# Patient Record
Sex: MALE
[De-identification: names, ages, dates, MRNs, and addresses within clinical notes are randomized per-mention and may not be internally consistent; named-entity substitution may affect disease eponyms.]

---

## 2022-12-19 ENCOUNTER — OFFICE VISIT (OUTPATIENT)
Dept: FAMILY MEDICINE CLINIC | Facility: CLINIC | Age: 47
End: 2022-12-19
Payer: OTHER GOVERNMENT

## 2022-12-19 VITALS
HEIGHT: 68 IN | OXYGEN SATURATION: 98 % | SYSTOLIC BLOOD PRESSURE: 112 MMHG | BODY MASS INDEX: 30.68 KG/M2 | WEIGHT: 202.4 LBS | HEART RATE: 63 BPM | DIASTOLIC BLOOD PRESSURE: 82 MMHG

## 2022-12-19 DIAGNOSIS — Z00.00 HEALTHCARE MAINTENANCE: Primary | ICD-10-CM

## 2022-12-19 DIAGNOSIS — L72.0 INCLUSION CYST: ICD-10-CM

## 2022-12-19 PROBLEM — M54.50 LOW BACK PAIN: Status: ACTIVE | Noted: 2022-12-19

## 2022-12-19 LAB
ALBUMIN SERPL-MCNC: 4.2 G/DL (ref 3.5–5)
ALBUMIN/GLOB SERPL: 1.4 {RATIO} (ref 0.4–1.6)
ALP SERPL-CCNC: 59 U/L (ref 50–136)
ALT SERPL-CCNC: 33 U/L (ref 12–65)
ANION GAP SERPL CALC-SCNC: 1 MMOL/L (ref 2–11)
AST SERPL-CCNC: 25 U/L (ref 15–37)
BASOPHILS # BLD: 0.1 K/UL (ref 0–0.2)
BASOPHILS NFR BLD: 2 % (ref 0–2)
BILIRUB DIRECT SERPL-MCNC: <0.1 MG/DL
BILIRUB SERPL-MCNC: 0.6 MG/DL (ref 0.2–1.1)
BUN SERPL-MCNC: 18 MG/DL (ref 6–23)
CALCIUM SERPL-MCNC: 9.3 MG/DL (ref 8.3–10.4)
CHLORIDE SERPL-SCNC: 105 MMOL/L (ref 101–110)
CHOLEST SERPL-MCNC: 192 MG/DL
CO2 SERPL-SCNC: 34 MMOL/L (ref 21–32)
CREAT SERPL-MCNC: 1 MG/DL (ref 0.8–1.5)
DIFFERENTIAL METHOD BLD: NORMAL
EOSINOPHIL # BLD: 0.1 K/UL (ref 0–0.8)
EOSINOPHIL NFR BLD: 1 % (ref 0.5–7.8)
ERYTHROCYTE [DISTWIDTH] IN BLOOD BY AUTOMATED COUNT: 13.4 % (ref 11.9–14.6)
GLOBULIN SER CALC-MCNC: 2.9 G/DL (ref 2.8–4.5)
GLUCOSE SERPL-MCNC: 78 MG/DL (ref 65–100)
HCT VFR BLD AUTO: 49.4 % (ref 41.1–50.3)
HDLC SERPL-MCNC: 50 MG/DL (ref 40–60)
HDLC SERPL: 3.8 {RATIO}
HGB BLD-MCNC: 16.3 G/DL (ref 13.6–17.2)
IMM GRANULOCYTES # BLD AUTO: 0 K/UL (ref 0–0.5)
IMM GRANULOCYTES NFR BLD AUTO: 0 % (ref 0–5)
LDLC SERPL CALC-MCNC: 94.4 MG/DL
LYMPHOCYTES # BLD: 2.3 K/UL (ref 0.5–4.6)
LYMPHOCYTES NFR BLD: 35 % (ref 13–44)
MCH RBC QN AUTO: 30.1 PG (ref 26.1–32.9)
MCHC RBC AUTO-ENTMCNC: 33 G/DL (ref 31.4–35)
MCV RBC AUTO: 91.1 FL (ref 82–102)
MONOCYTES # BLD: 0.7 K/UL (ref 0.1–1.3)
MONOCYTES NFR BLD: 10 % (ref 4–12)
NEUTS SEG # BLD: 3.4 K/UL (ref 1.7–8.2)
NEUTS SEG NFR BLD: 52 % (ref 43–78)
NRBC # BLD: 0 K/UL (ref 0–0.2)
PLATELET # BLD AUTO: 221 K/UL (ref 150–450)
PMV BLD AUTO: 11.9 FL (ref 9.4–12.3)
POTASSIUM SERPL-SCNC: 4.6 MMOL/L (ref 3.5–5.1)
PROT SERPL-MCNC: 7.1 G/DL (ref 6.3–8.2)
RBC # BLD AUTO: 5.42 M/UL (ref 4.23–5.6)
SODIUM SERPL-SCNC: 140 MMOL/L (ref 133–143)
TRIGL SERPL-MCNC: 238 MG/DL (ref 35–150)
TSH, 3RD GENERATION: 2.36 UIU/ML (ref 0.36–3.74)
VLDLC SERPL CALC-MCNC: 47.6 MG/DL (ref 6–23)
WBC # BLD AUTO: 6.5 K/UL (ref 4.3–11.1)

## 2022-12-19 PROCEDURE — 99203 OFFICE O/P NEW LOW 30 MIN: CPT | Performed by: FAMILY MEDICINE

## 2022-12-19 SDOH — ECONOMIC STABILITY: FOOD INSECURITY: WITHIN THE PAST 12 MONTHS, YOU WORRIED THAT YOUR FOOD WOULD RUN OUT BEFORE YOU GOT MONEY TO BUY MORE.: NEVER TRUE

## 2022-12-19 SDOH — ECONOMIC STABILITY: FOOD INSECURITY: WITHIN THE PAST 12 MONTHS, THE FOOD YOU BOUGHT JUST DIDN'T LAST AND YOU DIDN'T HAVE MONEY TO GET MORE.: NEVER TRUE

## 2022-12-19 ASSESSMENT — PATIENT HEALTH QUESTIONNAIRE - PHQ9
2. FEELING DOWN, DEPRESSED OR HOPELESS: 0
SUM OF ALL RESPONSES TO PHQ QUESTIONS 1-9: 0
SUM OF ALL RESPONSES TO PHQ QUESTIONS 1-9: 0
SUM OF ALL RESPONSES TO PHQ9 QUESTIONS 1 & 2: 0
SUM OF ALL RESPONSES TO PHQ QUESTIONS 1-9: 0
SUM OF ALL RESPONSES TO PHQ QUESTIONS 1-9: 0
1. LITTLE INTEREST OR PLEASURE IN DOING THINGS: 0

## 2022-12-19 ASSESSMENT — ENCOUNTER SYMPTOMS: RESPIRATORY NEGATIVE: 1

## 2022-12-19 ASSESSMENT — SOCIAL DETERMINANTS OF HEALTH (SDOH): HOW HARD IS IT FOR YOU TO PAY FOR THE VERY BASICS LIKE FOOD, HOUSING, MEDICAL CARE, AND HEATING?: NOT HARD AT ALL

## 2022-12-19 NOTE — PROGRESS NOTES
PROGRESS NOTE    SUBJECTIVE:   Lito Singh is a 41 y.o. male seen for a follow up visit regarding   Chief Complaint   Patient presents with    New Patient     Establish care        HPI:  Here to establish primary care with us. He is doing well presently. He reports that he has had recurrent problem with a cyst on the left low back, has had to have it drained twice. He would like to have this treated definitively. 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 Generally healthy, on no prescription medications. Reviewed and updated this visit by provider:  Tobacco  Meds  Problems  Med Hx  Surg Hx  Fam Hx           Review of Systems   Respiratory: Negative. Cardiovascular: Negative. OBJECTIVE:  Vitals:    12/19/22 0840   BP: 112/82   Site: Left Upper Arm   Cuff Size: Large Adult   Pulse: 63   SpO2: 98%   Weight: 202 lb 6.4 oz (91.8 kg)   Height: 5' 8\" (1.727 m)        Physical Exam   General: Alert and oriented x3, well-appearing  HEENT: Normocephalic; external ears, ear canals, and  Tympanic membranes normal; PERRLA, EOMI, normal conjunctiva; normal nasal mucosa without drainage; oropharynx is moist without mucosal lesion  Neck: No adenopathy, thyromegaly or thyroid nodules  Pulmonary: Normal effort, good airflow, no rales or rhonchi  CVS: Regular rate and rhythm, normal S1, S2, no S3 or S4, no murmurs; no carotid bruits, 2+ pedal pulses  Abdomen: Nondistended, normal bowel sounds, nontender without mass organomegaly  Extremities: No cyanosis/clubbing/edema  Skin: The left low back demonstrates a small well-healed scar with some residual erythema    Medical problems and test results were reviewed with the patient today. No results found for this or any previous visit (from the past 672 hour(s)). No results found for any visits on 12/19/22. ASSESSMENT and PLAN    1.  Healthcare maintenance  -     CBC with Auto Differential; Future  -     Basic Metabolic Panel; Future  -     Hepatic Function Panel; Future  -     Lipid Panel; Future  -     TSH; Future  -     Discussed colon cancer screening, patient wants to wait on colonoscopy  2. Inclusion cyst  -     Marion Wheatley MD, General Surgery, [facility]         No follow-ups on file.        Eliezer Sharif MD

## 2023-02-21 ENCOUNTER — OFFICE VISIT (OUTPATIENT)
Dept: FAMILY MEDICINE CLINIC | Facility: CLINIC | Age: 48
End: 2023-02-21
Payer: OTHER GOVERNMENT

## 2023-02-21 VITALS
WEIGHT: 200.2 LBS | HEART RATE: 72 BPM | SYSTOLIC BLOOD PRESSURE: 102 MMHG | BODY MASS INDEX: 30.34 KG/M2 | OXYGEN SATURATION: 100 % | DIASTOLIC BLOOD PRESSURE: 78 MMHG | HEIGHT: 68 IN

## 2023-02-21 DIAGNOSIS — L72.0 INCLUSION CYST: Primary | ICD-10-CM

## 2023-02-21 PROCEDURE — 99213 OFFICE O/P EST LOW 20 MIN: CPT | Performed by: FAMILY MEDICINE

## 2023-02-21 RX ORDER — DOXYCYCLINE HYCLATE 100 MG
100 TABLET ORAL 2 TIMES DAILY
Qty: 20 TABLET | Refills: 0 | Status: SHIPPED | OUTPATIENT
Start: 2023-02-21 | End: 2023-03-03

## 2023-02-21 SDOH — ECONOMIC STABILITY: INCOME INSECURITY: HOW HARD IS IT FOR YOU TO PAY FOR THE VERY BASICS LIKE FOOD, HOUSING, MEDICAL CARE, AND HEATING?: NOT HARD AT ALL

## 2023-02-21 SDOH — ECONOMIC STABILITY: FOOD INSECURITY: WITHIN THE PAST 12 MONTHS, THE FOOD YOU BOUGHT JUST DIDN'T LAST AND YOU DIDN'T HAVE MONEY TO GET MORE.: NEVER TRUE

## 2023-02-21 SDOH — ECONOMIC STABILITY: FOOD INSECURITY: WITHIN THE PAST 12 MONTHS, YOU WORRIED THAT YOUR FOOD WOULD RUN OUT BEFORE YOU GOT MONEY TO BUY MORE.: NEVER TRUE

## 2023-02-21 SDOH — ECONOMIC STABILITY: HOUSING INSECURITY
IN THE LAST 12 MONTHS, WAS THERE A TIME WHEN YOU DID NOT HAVE A STEADY PLACE TO SLEEP OR SLEPT IN A SHELTER (INCLUDING NOW)?: NO

## 2023-02-21 ASSESSMENT — PATIENT HEALTH QUESTIONNAIRE - PHQ9
SUM OF ALL RESPONSES TO PHQ QUESTIONS 1-9: 0
1. LITTLE INTEREST OR PLEASURE IN DOING THINGS: 0
SUM OF ALL RESPONSES TO PHQ9 QUESTIONS 1 & 2: 0
2. FEELING DOWN, DEPRESSED OR HOPELESS: 0
SUM OF ALL RESPONSES TO PHQ QUESTIONS 1-9: 0

## 2023-02-21 NOTE — PROGRESS NOTES
PROGRESS NOTE    SUBJECTIVE:   Karl Salinas is a 57 y.o. male seen for a follow up visit regarding   Chief Complaint   Patient presents with    Skin Problem     Reports cyst on back is red and seems infected. Painful. Has appointment in March to discuss removal.         HPI:  Patient presents with redness and swelling of a previously known inclusion cyst on the left low back. He has an upcoming appointment in March with dermatology, for excision. He is concerned that he now has an infection which will prevent him from getting this cut out in March. Reviewed and updated this visit by provider:  Tobacco  Allergies  Meds  Problems  Med Hx  Surg Hx  Fam Hx           Review of Systems       OBJECTIVE:  Vitals:    02/21/23 0812   BP: 102/78   Site: Left Upper Arm   Cuff Size: Medium Adult   Pulse: 72   SpO2: 100%   Weight: 200 lb 3.2 oz (90.8 kg)   Height: 5' 8\" (1.727 m)        Physical Exam   General: Alert and oriented, appears well, appropriate affect and thought content. Skin: Focal area of erythema and induration, surrounding typical inclusion cyst lesion, on the left low back    Medical problems and test results were reviewed with the patient today. No results found for this or any previous visit (from the past 672 hour(s)). No results found for any visits on 02/21/23. ASSESSMENT and PLAN    1. Inclusion cyst, with infection/inflammation  -     Doxycycline provided today. 100 mg twice daily for 10 days. Offered intralesional steroid  injection, patient declined. He will follow-up with Derm next month. Return if symptoms worsen or fail to improve.        Bill Milian MD

## 2023-03-16 ENCOUNTER — OFFICE VISIT (OUTPATIENT)
Dept: SURGERY | Age: 48
End: 2023-03-16
Payer: OTHER GOVERNMENT

## 2023-03-16 VITALS
DIASTOLIC BLOOD PRESSURE: 70 MMHG | HEART RATE: 81 BPM | OXYGEN SATURATION: 98 % | BODY MASS INDEX: 30.31 KG/M2 | WEIGHT: 200 LBS | SYSTOLIC BLOOD PRESSURE: 122 MMHG | HEIGHT: 68 IN

## 2023-03-16 DIAGNOSIS — D23.5 DERMOID CYST OF SKIN OF BACK: ICD-10-CM

## 2023-03-16 PROCEDURE — 99204 OFFICE O/P NEW MOD 45 MIN: CPT | Performed by: STUDENT IN AN ORGANIZED HEALTH CARE EDUCATION/TRAINING PROGRAM

## 2023-03-16 RX ORDER — CLINDAMYCIN PHOSPHATE 10 MG/G
GEL TOPICAL
Qty: 60 G | Refills: 2 | Status: SHIPPED | OUTPATIENT
Start: 2023-03-16

## 2023-03-16 NOTE — PROGRESS NOTES
General Surgery New Patient Office Note:    3/16/2023    Alexandru Blanco  MRN: [id number]      CHIEF COMPLAINT: Back cyst    PRIMARY CARE PHYSICIAN: Florian Vargas MD    HISTORY: Patient presents with a cyst to his back. Patient states that about a year ago it popped up and it got infected. He states that he went on some antibiotics which seemed to help. He then states that about 3 months ago it popped up and it got infected again and he went to urgent care and they lanced the area. He states that it is not infected currently. REVIEW OF SYSTEMS: 10 Point ROS negative except what is in HPI      History reviewed. No pertinent past medical history. No current outpatient medications on file. No current facility-administered medications for this visit. Family History   Problem Relation Age of Onset    Hypertension Mother     Hypertension Father        Social History     Socioeconomic History    Marital status:      Spouse name: None    Number of children: None    Years of education: None    Highest education level: None   Tobacco Use    Smoking status: Former     Packs/day: 0.50     Years: 1.50     Pack years: 0.75     Types: Cigarettes     Quit date:      Years since quittin.2    Smokeless tobacco: Never   Vaping Use    Vaping Use: Never used   Substance and Sexual Activity    Alcohol use:  Yes     Alcohol/week: 2.0 standard drinks     Types: 1 Glasses of wine, 1 Cans of beer per week    Drug use: Never     Social Determinants of Health     Financial Resource Strain: Low Risk     Difficulty of Paying Living Expenses: Not hard at all   Food Insecurity: No Food Insecurity    Worried About Running Out of Food in the Last Year: Never true    Ran Out of Food in the Last Year: Never true   Transportation Needs: Unknown    Lack of Transportation (Non-Medical): No   Housing Stability: Unknown    Unstable Housing in the Last Year: No         PHYSICAL EXAMINATION:    /70   Pulse 81   Ht 5' 8\" (1.727 m)   Wt 200 lb (90.7 kg)   SpO2 98%   BMI 30.41 kg/m²     General Appearance AOx3, in no acute distress  Eyes Conjunctivae/corneas clear. PERRL, EOM's intact. No scleral icterus  Ears, Nose, Throat ENT exam normal, no neck nodes or sinus tenderness  Neck supple, no significant adenopathy. No notable JVD  Respiratory No chest wall deformities or tenderness, respiratory effort normal, no use of accessory muscles. Cardiovascular RRR. No chest wall tenderness. Gastrointestinal Abdomen soft, nontender, nondistended. BS x4. No rebound, guarding, or rigidity present. No palpable masses. No CVA tenderness  Lymphatics No palpable lymphadenopathy, no hepatosplenomegaly  Musculoskeletal No joint tenderness, deformity or swelling. Full ROM UE/LE. Distal pulses intact UE/LE. No edema, cyanosis, or venous stasis changes. Skin Normal coloration and turgor, no rashes, back epidermoid inclusion cyst  Neurological alert, oriented, normal speech, no focal findings or movement disorder noted, CN II-XII intact  Psychiatric Alert and oriented, appropriate affect      STUDIES: None    IMPRESSION:  Back cyst    PLAN:  Discussed with the patient that we would need to get surgically excise out to the cyst as well as its capsule to get it from coming back again. Discussed doing this in the office versus hospital setting. Patient would like to do this in the office however he cannot do this today he will schedule an appointment later date. Risks of surgery discussed with pt and/or family present include risks of anesthesia (cardiopulmonary complications), MI, CVA, DVT,pain, bleeding, infection, injury to local viscera, wound problems, conversion to open procedure if laparoscopic procedure planned, and incomplete symptom resolution. They understand and agree to proceed.   I am going to send in some topical clindamycin ointment for him to utilize over the area if he feels like the area is starting to get infected again

## 2023-11-02 ENCOUNTER — OFFICE VISIT (OUTPATIENT)
Dept: SURGERY | Age: 48
End: 2023-11-02

## 2023-11-02 VITALS — HEIGHT: 68 IN | WEIGHT: 200 LBS | BODY MASS INDEX: 30.31 KG/M2

## 2023-11-02 DIAGNOSIS — Z51.89 VISIT FOR WOUND CHECK: ICD-10-CM

## 2023-11-02 DIAGNOSIS — D23.5 DERMOID CYST OF SKIN OF BACK: Primary | ICD-10-CM

## 2023-11-02 PROCEDURE — 99024 POSTOP FOLLOW-UP VISIT: CPT | Performed by: SURGERY

## 2023-11-02 NOTE — PROGRESS NOTES
Krish Giang is here today to have his wound check. He has had an epidermal inclusion cyst excised from his left lower back. This then became infected and he had to have it reexcised. It has been fine for 2 weeks but he just wanted to have it rechecked. There has been no drainage for 2 weeks. On examination the wound is healed. No external opening. No signs of its infection cellulitis or abscess. We will leave this alone for now and if he has any concerns at all I be happy to see him back.

## 2023-11-20 ENCOUNTER — OFFICE VISIT (OUTPATIENT)
Dept: FAMILY MEDICINE CLINIC | Facility: CLINIC | Age: 48
End: 2023-11-20
Payer: OTHER GOVERNMENT

## 2023-11-20 VITALS
SYSTOLIC BLOOD PRESSURE: 102 MMHG | WEIGHT: 197 LBS | BODY MASS INDEX: 29.86 KG/M2 | HEIGHT: 68 IN | DIASTOLIC BLOOD PRESSURE: 76 MMHG | OXYGEN SATURATION: 98 % | HEART RATE: 61 BPM

## 2023-11-20 DIAGNOSIS — M25.562 CHRONIC PAIN OF LEFT KNEE: Primary | ICD-10-CM

## 2023-11-20 DIAGNOSIS — G89.29 CHRONIC PAIN OF LEFT KNEE: Primary | ICD-10-CM

## 2023-11-20 PROCEDURE — 99213 OFFICE O/P EST LOW 20 MIN: CPT | Performed by: FAMILY MEDICINE

## 2023-11-20 ASSESSMENT — PATIENT HEALTH QUESTIONNAIRE - PHQ9
SUM OF ALL RESPONSES TO PHQ QUESTIONS 1-9: 0
2. FEELING DOWN, DEPRESSED OR HOPELESS: 0
1. LITTLE INTEREST OR PLEASURE IN DOING THINGS: 0
SUM OF ALL RESPONSES TO PHQ9 QUESTIONS 1 & 2: 0
SUM OF ALL RESPONSES TO PHQ QUESTIONS 1-9: 0

## 2023-11-20 ASSESSMENT — ENCOUNTER SYMPTOMS: RESPIRATORY NEGATIVE: 1

## 2023-11-20 NOTE — PROGRESS NOTES
PROGRESS NOTE    SUBJECTIVE:   Yanet Zuniga is a 57 y.o. male seen for a follow up visit regarding   Chief Complaint   Patient presents with    Knee Pain     Complains of left knee pain x 3 weeks; denies any injury. Does work out and run        HPI:  Healthy 82-year-old male presents with a 3-week history of left knee pain. No trauma or straining event reported. 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 He is very active, avid runner. He reports pain in the medial knee. Worse with activity, better with rest.  History of previous meniscus injury         Reviewed and updated this visit by provider:  Tobacco  Allergies  Meds  Problems  Med Hx  Surg Hx  Fam Hx           Review of Systems   Respiratory: Negative. Cardiovascular: Negative. OBJECTIVE:  Vitals:    11/20/23 1501   BP: 102/76   Site: Left Upper Arm   Cuff Size: Medium Adult   Pulse: 61   SpO2: 98%   Weight: 89.4 kg (197 lb)   Height: 1.727 m (5' 7.99\")        Physical Exam   The left knee is grossly normal-appearing. He has good active range of motion. There is no crepitus with PROM. There is no laxity to varus or valgus stress. Anterior drawer test is negative. Thessaly test is negative    Medical problems and test results were reviewed with the patient today. No results found for this or any previous visit (from the past 672 hour(s)). No results found for any visits on 11/20/23. ASSESSMENT and PLAN    1. Chronic pain of left knee  Orders:  -     Marin Simpson MD, Orthopedic Surgery, [address]         No follow-ups on file.        MD Kellie Cuadra

## 2024-03-25 ENCOUNTER — PATIENT MESSAGE (OUTPATIENT)
Dept: ORTHOPEDIC SURGERY | Age: 49
End: 2024-03-25

## 2024-03-25 NOTE — TELEPHONE ENCOUNTER
From: Víctor Powell  To: Dr. Tanisha Chan  Sent: 3/23/2024 12:55 PM EDT  Subject: Appointment Request    Appointment Request From: Víctor Powell    With Provider: Tanisha Chan MD [[facility]]    Preferred Date Range: 4/2/2024 – 4/4/2024    Preferred Times: Tuesday Morning, Tuesday Afternoon, Thursday Morning, Thursday Afternoon    Reason for visit: Request an Appointment    Comments:  Left shoulder pain.

## 2024-05-07 ENCOUNTER — OFFICE VISIT (OUTPATIENT)
Dept: ORTHOPEDIC SURGERY | Age: 49
End: 2024-05-07
Payer: OTHER GOVERNMENT

## 2024-05-07 DIAGNOSIS — M75.102 TEAR OF LEFT ROTATOR CUFF, UNSPECIFIED TEAR EXTENT, UNSPECIFIED WHETHER TRAUMATIC: Primary | ICD-10-CM

## 2024-05-07 DIAGNOSIS — G89.29 CHRONIC LEFT SHOULDER PAIN: ICD-10-CM

## 2024-05-07 DIAGNOSIS — M25.512 CHRONIC LEFT SHOULDER PAIN: ICD-10-CM

## 2024-05-07 PROCEDURE — 99214 OFFICE O/P EST MOD 30 MIN: CPT | Performed by: STUDENT IN AN ORGANIZED HEALTH CARE EDUCATION/TRAINING PROGRAM

## 2024-05-24 NOTE — PROGRESS NOTES
Name: Víctor Powell  YOB: 1975  Gender: male  MRN: [id number]      CC: Results (Left Shoulder MRI)       HPI: Víctor Powell is a 49 y.o. male who returns for follow up and MRI results of the left shoulder.     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 He has a history of bilateral shoulder issues with a history of right rotator cuff and labral repair years ago. He has not had any surgical treatment or injections to the left shoulder. He has recently had acute worsening of his left shoulder posterior pain trying to do more exercises in the gym, particularly chest / push exercises. His pain was severe enough that he stopped exercising and the rest has helped somewhat. He is concerned he has further injured his shoulder and about what activity he can proceed with. He does have pain with sleep.     XR: mild AC joint arthritis; on exam, he has pain with rotator cuff resisted maneuvers but good strength  5/7/24: An MRI was order for further evaluation.     5/29/24: MR results today. Still very limited in activity due to pain with exercises    Physical Examination:  General: no acute distress, well appearing  Lungs: no respiratory distress or stridor   CV: regular rhythm by pulse, normal capillary refill    Left Shoulder:      Inspection: No deformity, No erythema  ROM: Active / passive forward flexion 180 / 180  Active / passive abduction 170 / 170  Internal rotation 60  External rotation 80  Tenderness: tenderness to distal infraspinatus, quadrilateral space, mild rtc insertion pain.   Strength: Abduction 5/5, External rotation 5/5, Internal rotation 5/5  Provocative tests: Jobes positive.  Hornblower's negative.  He does have pain with resisted external rotation.  Bob positive.  Negative cross body adduction.  Negative anterior and posterior apprehension.  No pain with speeds or Stanton's.  Normal capillary refill / 2+ radial pulse   Sensation intact to light

## 2024-05-29 ENCOUNTER — OFFICE VISIT (OUTPATIENT)
Dept: ORTHOPEDIC SURGERY | Age: 49
End: 2024-05-29
Payer: OTHER GOVERNMENT

## 2024-05-29 DIAGNOSIS — M75.112 NONTRAUMATIC INCOMPLETE TEAR OF LEFT ROTATOR CUFF: Primary | ICD-10-CM

## 2024-05-29 DIAGNOSIS — G89.29 CHRONIC LEFT SHOULDER PAIN: ICD-10-CM

## 2024-05-29 DIAGNOSIS — M25.512 CHRONIC LEFT SHOULDER PAIN: ICD-10-CM

## 2024-05-29 PROCEDURE — 99213 OFFICE O/P EST LOW 20 MIN: CPT | Performed by: STUDENT IN AN ORGANIZED HEALTH CARE EDUCATION/TRAINING PROGRAM

## 2024-05-31 ENCOUNTER — TELEPHONE (OUTPATIENT)
Dept: ORTHOPEDIC SURGERY | Age: 49
End: 2024-05-31

## 2024-06-27 NOTE — PROGRESS NOTES
Determinants of Health     Financial Resource Strain: Low Risk  (2/21/2023)    Overall Financial Resource Strain (CARDIA)     Difficulty of Paying Living Expenses: Not hard at all   Food Insecurity: Not on file (2/21/2023)   Transportation Needs: Unknown (2/21/2023)    PRAPARE - Transportation     Lack of Transportation (Medical): Not on file     Lack of Transportation (Non-Medical): No   Physical Activity: Not on file   Stress: Not on file   Social Connections: Not on file   Intimate Partner Violence: Not on file   Housing Stability: Unknown (2/21/2023)    Housing Stability Vital Sign     Unable to Pay for Housing in the Last Year: Not on file     Number of Places Lived in the Last Year: Not on file     Unstable Housing in the Last Year: No               No data to display                Review of Systems  Noncontributary   Pertinent positives and negatives are addressed with the patient, particularly those related to musculoskeletal concerns.  Non-orthopaedic concerns were referred back to the primary care physician.    PE:    GEN: General appearance is that of a healthy patient, alert and oriented, in no distress. WDWN.  HEENT:  Normocephalic, atraumatic.  Extraocular muscles are intact.  Neck:  Supple.   Heart:  Regular pulse exam on all extremities.  Good color and warmth noted.  Lungs:  Normal non-labored breathing with no obvious shortness of breath.  Abdomen:  WNL's.  Skin:  No signs of rash or bruising.    Pysch: Answers questions appropriately, AO X 3.  MSK:  Cervical spine: No tenderness. Normal active motion. Negative Spurling's maneuver.     SHOULDER   Left (Involved) Right   Skin Intact Intact   Radial Pulses 2+ Symmetrical 2+ Symmetrical   Deformity None Normal   Myotomes Normal Normal   Dermatomes  Normal Normal    ROM Full Full   Strength Normal except for open can No weakness   Atrophy None noted None noted   Effusion/Swelling  None noted None noted   Palpation  TTP at the shoulder posteriorly No

## 2024-06-28 ENCOUNTER — OFFICE VISIT (OUTPATIENT)
Dept: ORTHOPEDIC SURGERY | Age: 49
End: 2024-06-28

## 2024-06-28 DIAGNOSIS — M25.512 LEFT SHOULDER PAIN, UNSPECIFIED CHRONICITY: Primary | ICD-10-CM

## 2024-06-28 DIAGNOSIS — M75.102 TEAR OF LEFT ROTATOR CUFF, UNSPECIFIED TEAR EXTENT, UNSPECIFIED WHETHER TRAUMATIC: ICD-10-CM

## 2024-06-28 NOTE — PATIENT INSTRUCTIONS
times. Try to reach higher each time.  It's a good idea to repeat these steps with your other arm.  Wall climb (to the front)    Face a wall, and stand so your fingers can just touch it.  Keeping your shoulder down (don't shrug), walk the fingers of your affected arm up the wall as high as pain permits. Keep your back straight. Don't arch your back.  Hold that position for at least a few seconds.  Slowly walk your fingers back down.  Repeat at least 2 to 4 times. Try to reach higher each time. When you are able to reach higher, you can take a step toward the wall as you walk your fingers up, then step back as you walk your fingers back down.  It's a good idea to repeat these steps with your other arm.  Shoulder-blade squeeze    Sit or stand up straight with your arms at your sides.  Keep your shoulders relaxed and down, not shrugged.  Squeeze your shoulder blades down and together.  Hold for about 6 seconds, then relax.  Repeat 8 to 12 times.  Scapular arm reach (lying down)    Lie on your back on a firm surface, like the floor or a firm bed. You can bend your knees or put a pillow under your knees.  Point your arms toward the ceiling. Keep your elbows straight.  Reach higher toward the ceiling. Keep your elbows straight. All motion should be from your shoulder blades. Your shoulders will come off the floor or bed a little bit.  Slowly relax and return your arms to the starting position.  Repeat 8 to 12 times.  Arm raise to the side    Start with your affected arm at your side, with your palm facing forward.  Slowly raise your affected arm to the side.  Hold the position for 1 to 2 seconds. Then slowly lower your arm back to your side. If you need to, bring your other arm across your body and place it under the elbow as you lower your affected arm. Use that other arm to keep your affected arm from dropping down too fast.  Repeat 8 to 12 times.  It's a good idea to repeat these steps with your other arm.  Keep your